# Patient Record
Sex: FEMALE | Race: WHITE
[De-identification: names, ages, dates, MRNs, and addresses within clinical notes are randomized per-mention and may not be internally consistent; named-entity substitution may affect disease eponyms.]

---

## 2020-10-08 NOTE — ROOR
________________________________________________________________________________

Patient Name: Edwige Riggs         Procedure Date: 10/8/2020 8:20 AM

MRN: [id number]                          Account Number: [id number]

YOB: 1950              Age: 69

Room: [facility]                            Gender: Female

Note Status: Finalized                 

________________________________________________________________________________

 

Procedure:           Colonoscopy

Indications:         Screening patient at increased risk: Family history of 

                     1st-degree relative with colorectal cancer at age 60 

                     years (or older)

Providers:           Leo J. Gosselin Jr, MD

Referring MD:        Marcus Freeman MD

Requesting Provider: 

Medicines:           Propofol per Anesthesia

Complications:       No immediate complications.

________________________________________________________________________________

Procedure:           Pre-Anesthesia Assessment:

                     - Prior to the procedure, a History and Physical was 

                     performed, and patient medications and allergies were 

                     reviewed. The patient is competent. The risks and 

                     benefits of the procedure and the sedation options and 

                     risks were discussed with the patient. All questions were 

                     answered and informed consent was obtained. Patient 

                     identification and proposed procedure were verified by 

                     the physician and the nurse in the pre-procedure area and 

                     in the procedure room. Mental Status Examination: alert 

                     and oriented. Airway Examination: normal oropharyngeal 

                     airway and neck mobility. Respiratory Examination: clear 

                     to auscultation. CV Examination: normal. ASA Grade 

                     Assessment: II - A patient with mild systemic disease. 

                     After reviewing the risks and benefits, the patient was 

                     deemed in satisfactory condition to undergo the 

                     procedure. The anesthesia plan was to use moderate 

                     sedation / analgesia (conscious sedation). Immediately 

                     prior to administration of medications, the patient was 

                     re-assessed for adequacy to receive sedatives. The heart 

                     rate, respiratory rate, oxygen saturations, blood 

                     pressure, adequacy of pulmonary ventilation, and response 

                     to care were monitored throughout the procedure. The 

                     physical status of the patient was re-assessed after the 

                     procedure.

                     The Colonoscope was introduced through the anus and 

                     advanced to the cecum, identified by appendiceal orifice 

                     and ileocecal valve. The colonoscopy was performed 

                     without difficulty. The patient tolerated the procedure 

                     well. The quality of the bowel preparation was adequate.

                                                                                

Findings:

     The rectum, recto-sigmoid colon, sigmoid colon, descending colon, 

     transverse colon, ascending colon, cecum, appendiceal orifice and 

     ileocecal valve appeared normal.

                                                                                

Impression:          - The rectum, recto-sigmoid colon, sigmoid colon, 

                     descending colon, transverse colon, ascending colon, 

                     cecum, appendiceal orifice and ileocecal valve are normal.

                     - No specimens collected.

Recommendation:      - Discharge patient to home (ambulatory).

                     - Repeat colonoscopy in 5-10 years for screening purposes.

                                                                                

 

Leo Gosselin MD

_____________________

Leo J Gosselin Jr, MD

10/8/2020 8:35:41 AM

Electronically signed by Leo Gosselin Jr , MD

Number of Addenda: 0

 

Note Initiated On: 10/8/2020 8:20 AM

Estimated Blood Loss:

     Estimated blood loss: none.

## 2022-09-25 ENCOUNTER — HOSPITAL ENCOUNTER (OUTPATIENT)
Dept: HOSPITAL 53 - M LABSMTC | Age: 72
End: 2022-09-25
Attending: ANESTHESIOLOGY
Payer: MEDICARE

## 2022-09-25 DIAGNOSIS — Z20.828: Primary | ICD-10-CM

## 2022-09-25 DIAGNOSIS — Z11.59: ICD-10-CM

## 2022-09-29 ENCOUNTER — HOSPITAL ENCOUNTER (OUTPATIENT)
Dept: HOSPITAL 53 - M OPP | Age: 72
Discharge: HOME | End: 2022-09-29
Attending: SURGERY
Payer: MEDICARE

## 2022-09-29 VITALS — DIASTOLIC BLOOD PRESSURE: 59 MMHG | SYSTOLIC BLOOD PRESSURE: 105 MMHG

## 2022-09-29 VITALS — HEIGHT: 58 IN | WEIGHT: 126.4 LBS | BODY MASS INDEX: 26.53 KG/M2

## 2022-09-29 DIAGNOSIS — F41.9: ICD-10-CM

## 2022-09-29 DIAGNOSIS — K44.9: ICD-10-CM

## 2022-09-29 DIAGNOSIS — K30: Primary | ICD-10-CM

## 2022-09-29 DIAGNOSIS — C91.10: ICD-10-CM

## 2022-09-29 DIAGNOSIS — Z79.899: ICD-10-CM

## 2022-09-29 PROCEDURE — 43235 EGD DIAGNOSTIC BRUSH WASH: CPT
